# Patient Record
Sex: FEMALE | Race: BLACK OR AFRICAN AMERICAN
[De-identification: names, ages, dates, MRNs, and addresses within clinical notes are randomized per-mention and may not be internally consistent; named-entity substitution may affect disease eponyms.]

---

## 2019-02-11 ENCOUNTER — HOSPITAL ENCOUNTER (EMERGENCY)
Dept: HOSPITAL 74 - JER | Age: 29
LOS: 1 days | Discharge: HOME | End: 2019-02-12
Payer: COMMERCIAL

## 2019-02-11 VITALS — BODY MASS INDEX: 40.3 KG/M2

## 2019-02-11 DIAGNOSIS — Z3A.13: ICD-10-CM

## 2019-02-11 DIAGNOSIS — O26.891: Primary | ICD-10-CM

## 2019-02-11 DIAGNOSIS — O21.0: ICD-10-CM

## 2019-02-11 LAB
ALBUMIN SERPL-MCNC: 3.2 G/DL (ref 3.4–5)
ALP SERPL-CCNC: 94 U/L (ref 45–117)
ALT SERPL-CCNC: 17 U/L (ref 13–61)
ANION GAP SERPL CALC-SCNC: 10 MMOL/L (ref 8–16)
ANION GAP SERPL CALC-SCNC: 8 MMOL/L (ref 8–16)
AST SERPL-CCNC: 12 U/L (ref 15–37)
BASOPHILS # BLD: 0.8 % (ref 0–2)
BILIRUB DIRECT SERPL-MCNC: 143 U/L (ref 84–246)
BILIRUB SERPL-MCNC: 0.2 MG/DL (ref 0.2–1)
BUN SERPL-MCNC: 7 MG/DL (ref 7–18)
BUN SERPL-MCNC: 8 MG/DL (ref 7–18)
CALCIUM SERPL-MCNC: 8.1 MG/DL (ref 8.5–10.1)
CALCIUM SERPL-MCNC: 9.2 MG/DL (ref 8.5–10.1)
CHLORIDE SERPL-SCNC: 107 MMOL/L (ref 98–107)
CHLORIDE SERPL-SCNC: 109 MMOL/L (ref 98–107)
CO2 SERPL-SCNC: 22 MMOL/L (ref 21–32)
CO2 SERPL-SCNC: 22 MMOL/L (ref 21–32)
CREAT SERPL-MCNC: 0.5 MG/DL (ref 0.55–1.3)
CREAT SERPL-MCNC: 0.5 MG/DL (ref 0.55–1.3)
DEPRECATED RDW RBC AUTO: 17.2 % (ref 11.6–15.6)
EOSINOPHIL # BLD: 1.8 % (ref 0–4.5)
GLUCOSE SERPL-MCNC: 101 MG/DL (ref 74–106)
GLUCOSE SERPL-MCNC: 107 MG/DL (ref 74–106)
HCT VFR BLD CALC: 35.1 % (ref 32.4–45.2)
HGB BLD-MCNC: 11.7 GM/DL (ref 10.7–15.3)
LYMPHOCYTES # BLD: 27.2 % (ref 8–40)
MAGNESIUM SERPL-MCNC: 1.9 MG/DL (ref 1.8–2.4)
MCH RBC QN AUTO: 26.5 PG (ref 25.7–33.7)
MCHC RBC AUTO-ENTMCNC: 33.2 G/DL (ref 32–36)
MCV RBC: 80 FL (ref 80–96)
MONOCYTES # BLD AUTO: 6.2 % (ref 3.8–10.2)
NEUTROPHILS # BLD: 64 % (ref 42.8–82.8)
PLATELET # BLD AUTO: 265 K/MM3 (ref 134–434)
PMV BLD: 8.3 FL (ref 7.5–11.1)
POTASSIUM SERPLBLD-SCNC: 3.7 MMOL/L (ref 3.5–5.1)
POTASSIUM SERPLBLD-SCNC: 3.8 MMOL/L (ref 3.5–5.1)
PROT SERPL-MCNC: 7 G/DL (ref 6.4–8.2)
RBC # BLD AUTO: 4.4 M/MM3 (ref 3.6–5.2)
SODIUM SERPL-SCNC: 137 MMOL/L (ref 136–145)
SODIUM SERPL-SCNC: 140 MMOL/L (ref 136–145)
URATE SERPL-SCNC: 2.4 MG/DL (ref 2.6–7.2)
WBC # BLD AUTO: 9.1 K/MM3 (ref 4–10)

## 2019-02-11 PROCEDURE — 3E033GC INTRODUCTION OF OTHER THERAPEUTIC SUBSTANCE INTO PERIPHERAL VEIN, PERCUTANEOUS APPROACH: ICD-10-PCS

## 2019-02-11 NOTE — PDOC
Attending Attestation





- HPI


HPI: 





19 22:18





The patient is a 29 year old female, A2, with no PMH who presents to the ER 

complaining of vomiting today. Patient states she had 8 episodes of nonbloody, 

non bilious vomiting with associated 7/10 bilateral pelvic pain. Last menstrual 

period was . Patient is following up with Dr. Marquez, OB GYN. 

Patient notes similar symptoms in her previous pregnancy. 





The patient denies any vaginal bleeding, cp, sob, fever, chills, N/V/D/C, or 

urinary symptoms. 








<Esther Johnson - Last Filed: 19 22:17>





- Resident


Resident Name: Raquel Marmolejo





- ED Attending Attestation


I have performed the following: I have examined & evaluated the patient, The 

case was reviewed & discussed with the resident, I agree w/resident's findings 

& plan





- Physicial Exam


PE: 





19 01:40


Agree with resident's exam





- Medical Decision Making





19 01:40


29-year-old female with lower abdominal cramping increases with movement, 

currently 13 weeks' gestational age confirmed with ultrasound


Patient improved after IV fluids and Reglan in the emergency department


Initial blood pressure borderline elevated though there is absence of edema or 

proteinuria


Repeat blood pressures 129/76


Patient is ready to go home


Plan for discharge home with prompt GYN follow-up





19 01:47








<Silvia Wolfe - Last Filed: 19 01:47>

## 2019-02-11 NOTE — PDOC
Rapid Medical Evaluation


Medical Evaluation: 


 Allergies











Allergy/AdvReac Type Severity Reaction Status Date / Time


 


No Known Allergies Allergy   Verified 12/06/13 07:18








I have performed a brief in-person evaluation of this patient.


The patient presents with a chief complaint of: 13 weeks pregnant, c/o multiple 

episodes of emesis since start of pregnancy, worse this week; denies vaginal 

bleeding, but has some lower abdominal pain


Pertinent physical exam findings: In NAD


I have ordered the following: Labs, pelvic sono


The patient will proceed to the ED for further evaluation.





02/11/19 19:41

## 2019-02-11 NOTE — PDOC
History of Present Illness





- General


Chief Complaint: Nausea/Vomiting


Stated Complaint: 13 WEEK PREG, VOMITING


Time Seen by Provider: 19 19:41





- History of Present Illness


Initial Comments: 





19 21:41


29 year old woman, A2, who presents with multiple episodes of nbnb vomiting 

x 8 





since this pregnancy that is worse this week 





The patient denies any vaginal bleeding, but admits to some lower abdominal 

pain. 














19 22:35








Past History





- Past Medical History


Allergies/Adverse Reactions: 


 Allergies











Allergy/AdvReac Type Severity Reaction Status Date / Time


 


No Known Allergies Allergy   Verified 13 07:18











Home Medications: 


Ambulatory Orders





Acetaminophen [Tylenol .Regular Strength -] 650 mg PO Q4H PRN #0 tablet 12/10/

13 


Ibuprofen [Motrin -] 600 mg PO Q4H PRN #0 tablet 12/10/13 


Doxylamine Succinate/Vit B6 [Diclegis Dr 10-10 mg Tablet] 1 each PO DAILY #20 

tablet.dr VILLEGAS 40mg 19 


Doxylamine Succinate/Vit B6 [Diclegis Dr 10-10 mg Tablet] 1 each PO DAILY #20 

tablet.dr VILLEGAS 40mg 19 


Doxylamine Succinate/Vit B6 [Diclegis Dr 10-10 mg Tablet] 1 each PO DAILY #20 

tablet.dr VILLEGAS 40mg 19 








Anemia: Yes


Asthma: No


Cancer: No


Cardiac Disorders: No


COPD: No


Diabetes: No


HTN: Yes


Seizures: No


Thyroid Disease: No





- Suicide/Smoking/Psychosocial Hx


Smoking Status: No


Smoking History: Never smoked


Have you smoked in the past 12 months: No


Number of Cigarettes Smoked Daily: 0


Hx Alcohol Use: No


Drug/Substance Use Hx: No


Hx Substance Use Treatment: No





*Physical Exam





- Vital Signs


 Last Vital Signs











Temp Pulse Resp BP Pulse Ox


 


 99.4 F   100 H  18   158/88   100 


 


 19 19:42  19 19:42  19 19:42  19 19:42  19 19:42














Moderate Sedation





- Procedure Monitoring


Vital Signs: 


Procedure Monitoring Vital Signs











Temperature  99.4 F   19 19:42


 


Pulse Rate  100 H  19 19:42


 


Respiratory Rate  18   19 19:42


 


Blood Pressure  158/88   19 19:42


 


O2 Sat by Pulse Oximetry (%)  100   19 19:42











ED Treatment Course





- LABORATORY


CBC & Chemistry Diagram: 


 19 20:10





 19 20:10





- ADDITIONAL ORDERS


Additional order review: 


 Laboratory  Results











  19





  20:10


 


Sodium  140


 


Potassium  3.8


 


Chloride  109 H


 


Carbon Dioxide  22


 


Anion Gap  10


 


BUN  8


 


Creatinine  0.5 L


 


Creat Clearance w eGFR  > 60


 


Random Glucose  107 H


 


Calcium  9.2


 


Beta HCG, Quant  15573.1








 











  19





  20:10


 


RBC  4.40


 


MCV  80.0


 


MCHC  33.2


 


RDW  17.2 H


 


MPV  8.3


 


Neutrophils %  64.0


 


Lymphocytes %  27.2  D


 


Monocytes %  6.2


 


Eosinophils %  1.8


 


Basophils %  0.8














*DC/Admit/Observation/Transfer


Diagnosis at time of Disposition: 


 Nausea and vomiting during pregnancy








- Discharge Dispostion


Disposition: HOME


Condition at time of disposition: Stable


Decision to Admit order: No





- Referrals





- Patient Instructions


Printed Discharge Instructions:  DI for Vomiting -- Adult


Additional Instructions: 


You were seen in the ED for complaints of nausea and vomiting in pregnancy.





In the ED you were evaluated with labwork and imaging. 


Your results showed a single live intrauterine pregnancy at 13 weeks and 4 days 

w/ fetal heart rate of 174bpm.


There does not appear to be an acute need for immediate hospitalization.





You are advised to follow up with your Primary Care Physician within 1 week.


Please follow up with your OBGYN within 1 week.


You were given a prescription for Diclegis and are advised to take the 

medication as directed.





Return to the ED immediately if you experience worsening abdominal pain, 

worsening nausea, vomiting, blood in the vomit, vaginal bleeding or discharge, 

headaches, loss of consciousness or fever. 








- Post Discharge Activity

## 2019-02-12 VITALS — HEART RATE: 80 BPM | TEMPERATURE: 99 F | SYSTOLIC BLOOD PRESSURE: 129 MMHG | DIASTOLIC BLOOD PRESSURE: 76 MMHG

## 2019-02-12 LAB
APPEARANCE UR: CLEAR
BILIRUB UR STRIP.AUTO-MCNC: NEGATIVE MG/DL
COLOR UR: YELLOW
KETONES UR QL STRIP: NEGATIVE
LEUKOCYTE ESTERASE UR QL STRIP.AUTO: NEGATIVE
NITRITE UR QL STRIP: NEGATIVE
PH UR: 6 [PH] (ref 5–8)
PROT UR QL STRIP: NEGATIVE
PROT UR QL STRIP: NEGATIVE
SP GR UR: 1.02 (ref 1.01–1.03)
UROBILINOGEN UR STRIP-MCNC: NEGATIVE MG/DL (ref 0.2–1)

## 2019-02-12 NOTE — PDOC
*Physical Exam





- Vital Signs


 Last Vital Signs











Temp Pulse Resp BP Pulse Ox


 


 99.4 F   100 H  18   158/88   100 


 


 02/11/19 19:42  02/11/19 19:42  02/11/19 19:42  02/11/19 19:42  02/11/19 19:42














ED Treatment Course





- LABORATORY


CBC & Chemistry Diagram: 


 02/11/19 20:10





 02/11/19 23:13





- ADDITIONAL ORDERS


Additional order review: 


 Laboratory  Results











  02/12/19 02/11/19 02/11/19





  00:52 23:13 20:10


 


Sodium   137  140


 


Potassium   3.7  3.8


 


Chloride   107  109 H


 


Carbon Dioxide   22  22


 


Anion Gap   8  10


 


BUN   7  8


 


Creatinine   0.5 L  0.5 L


 


Creat Clearance w eGFR   > 60  > 60


 


Random Glucose   101  107 H


 


Uric Acid   2.4 L 


 


Calcium   8.1 L  9.2


 


Magnesium   1.9 


 


Total Bilirubin   0.2 


 


AST   12 L 


 


ALT   17 


 


Alkaline Phosphatase   94 


 


LD Total   143 


 


Total Protein   7.0 


 


Albumin   3.2 L 


 


Beta HCG, Quant    76727.1


 


Urine Color  Yellow  


 


Urine Appearance  Clear  


 


Urine pH  6.0  


 


Ur Specific Gravity  1.023  


 


Urine Protein  Negative  


 


Urine Glucose (UA)  Negative  


 


Urine Ketones  Negative  


 


Urine Blood  Negative  


 


Urine Nitrite  Negative  


 


Urine Bilirubin  Negative  


 


Urine Urobilinogen  Negative  


 


Ur Leukocyte Esterase  Negative  








 











  02/11/19





  20:10


 


RBC  4.40


 


MCV  80.0


 


MCHC  33.2


 


RDW  17.2 H


 


MPV  8.3


 


Neutrophils %  64.0


 


Lymphocytes %  27.2  D


 


Monocytes %  6.2


 


Eosinophils %  1.8


 


Basophils %  0.8














- Medications


Given in the ED: 


ED Medications














Discontinued Medications














Generic Name Dose Route Start Last Admin





  Trade Name Garoq  PRN Reason Stop Dose Admin


 


Sodium Chloride  1,000 mls @ 1,000 mls/hr  02/11/19 19:43  02/11/19 21:52





  Normal Saline -  IV  02/11/19 20:42  1,000 mls/hr





  ASDIR STA   Administration





     





     





     





     


 


Ondansetron HCl  4 mg  02/11/19 19:43  02/11/19 21:52





  Zofran Injection  IVPUSH  02/11/19 19:44  4 mg





  ONCE ONE   Administration





     





     





     





     


 


Pyridoxine HCl  50 mg  02/11/19 19:45  02/12/19 00:26





  Vitamin B6 -  PO  02/11/19 19:46  Not Given





  ONCE ONE   





     





     





     





     














Medical Decision Making





- Medical Decision Making





Received sign out from resident Dr. Perez. Pregnant female at A 13wks with 

nausea and vomiting. H/o pre-eclampsia with previous pregnancy. Pelvic 

ultrasound revealed single live IUP with fetal heart rate of 174 bpm. Will 

follow up on pending labs and UA. 





CMP unremarkable for significant electrolyte derangement, LFT elevation, or 

renal function. UA unremarkable for pyuria, nitrites, or leukocyte esterase. 

Low suspicion for cystitis. 





Pt stable to discharge home with outpatient follow up. Discussed imaging and 

laboratory results with pt. Answered all questions. Provided return 

precautions. Pt expressed verbal understanding and agreement with plan to 

discharge home with outpatient follow up. Provided copies of laboratory and 

imaging results. 








*DC/Admit/Observation/Transfer


Diagnosis at time of Disposition: 


 Nausea and vomiting during pregnancy








- Discharge Dispostion


Disposition: HOME


Condition at time of disposition: Stable





- Prescriptions


Prescriptions: 


Doxylamine Succinate/Vit B6 [Diclegis Dr 10-10 mg Tablet] 1 each PO DAILY #20 

tablet.dr VILLEGAS 40mg


Doxylamine Succinate/Vit B6 [Diclegis Dr 10-10 mg Tablet] 1 each PO DAILY #20 

tablet.dr VILLEGAS 40mg


Doxylamine Succinate/Vit B6 [Diclegis Dr 10-10 mg Tablet] 1 each PO DAILY #20 

tablet.dr VILLEGAS 40mg





- Referrals





- Patient Instructions


Printed Discharge Instructions:  DI for Vomiting -- Adult


Additional Instructions: 


You were seen in the ED for complaints of nausea and vomiting in pregnancy.





In the ED you were evaluated with labwork and imaging. 


Your results showed a single live intrauterine pregnancy at 13 weeks and 4 days 

w/ fetal heart rate of 174bpm.


There does not appear to be an acute need for immediate hospitalization.





You are advised to follow up with your Primary Care Physician within 1 week.


Please follow up with your OBGYN within 1 week.


You were given a prescription for Diclegis and are advised to take the 

medication as directed.





Return to the ED immediately if you experience worsening abdominal pain, 

worsening nausea, vomiting, blood in the vomit, vaginal bleeding or discharge, 

headaches, loss of consciousness or fever. 





Print Language: ENGLISH





- Post Discharge Activity


Forms/Work/School Notes:  Back to Work

## 2019-03-06 ENCOUNTER — HOSPITAL ENCOUNTER (EMERGENCY)
Dept: HOSPITAL 74 - JER | Age: 29
Discharge: HOME | End: 2019-03-06
Payer: COMMERCIAL

## 2019-03-06 VITALS — HEART RATE: 80 BPM

## 2019-03-06 VITALS — SYSTOLIC BLOOD PRESSURE: 127 MMHG | TEMPERATURE: 99.2 F | DIASTOLIC BLOOD PRESSURE: 81 MMHG

## 2019-03-06 VITALS — BODY MASS INDEX: 40.3 KG/M2

## 2019-03-06 DIAGNOSIS — O26.892: Primary | ICD-10-CM

## 2019-03-06 DIAGNOSIS — N93.9: ICD-10-CM

## 2019-03-06 DIAGNOSIS — Z3A.16: ICD-10-CM

## 2019-03-06 LAB
ALBUMIN SERPL-MCNC: 3.2 G/DL (ref 3.4–5)
ALP SERPL-CCNC: 101 U/L (ref 45–117)
ALT SERPL-CCNC: 17 U/L (ref 13–61)
ANION GAP SERPL CALC-SCNC: 8 MMOL/L (ref 8–16)
APPEARANCE UR: (no result)
AST SERPL-CCNC: 8 U/L (ref 15–37)
BASOPHILS # BLD: 0.4 % (ref 0–2)
BILIRUB SERPL-MCNC: 0.3 MG/DL (ref 0.2–1)
BILIRUB UR STRIP.AUTO-MCNC: NEGATIVE MG/DL
BUN SERPL-MCNC: 7 MG/DL (ref 7–18)
CALCIUM SERPL-MCNC: 9.1 MG/DL (ref 8.5–10.1)
CHLORIDE SERPL-SCNC: 109 MMOL/L (ref 98–107)
CO2 SERPL-SCNC: 22 MMOL/L (ref 21–32)
COLOR UR: (no result)
CREAT SERPL-MCNC: 0.5 MG/DL (ref 0.55–1.3)
DEPRECATED RDW RBC AUTO: 17 % (ref 11.6–15.6)
EOSINOPHIL # BLD: 1.9 % (ref 0–4.5)
EPITH CASTS URNS QL MICRO: (no result) /HPF
GLUCOSE SERPL-MCNC: 87 MG/DL (ref 74–106)
HCT VFR BLD CALC: 34.4 % (ref 32.4–45.2)
HGB BLD-MCNC: 11.5 GM/DL (ref 10.7–15.3)
INR BLD: 1.03 (ref 0.83–1.09)
KETONES UR QL STRIP: (no result)
LEUKOCYTE ESTERASE UR QL STRIP.AUTO: NEGATIVE
LYMPHOCYTES # BLD: 18.5 % (ref 8–40)
MCH RBC QN AUTO: 27.5 PG (ref 25.7–33.7)
MCHC RBC AUTO-ENTMCNC: 33.6 G/DL (ref 32–36)
MCV RBC: 81.8 FL (ref 80–96)
MONOCYTES # BLD AUTO: 5.5 % (ref 3.8–10.2)
MUCOUS THREADS URNS QL MICRO: (no result)
NEUTROPHILS # BLD: 73.7 % (ref 42.8–82.8)
NITRITE UR QL STRIP: NEGATIVE
PH UR: 6 [PH] (ref 5–8)
PLATELET # BLD AUTO: 233 K/MM3 (ref 134–434)
PMV BLD: 8.3 FL (ref 7.5–11.1)
POTASSIUM SERPLBLD-SCNC: 3.6 MMOL/L (ref 3.5–5.1)
PROT SERPL-MCNC: 7.2 G/DL (ref 6.4–8.2)
PROT UR QL STRIP: (no result)
PROT UR QL STRIP: NEGATIVE
PT PNL PPP: 12.2 SEC (ref 9.7–13)
RBC # BLD AUTO: 4.2 M/MM3 (ref 3.6–5.2)
SODIUM SERPL-SCNC: 139 MMOL/L (ref 136–145)
SP GR UR: 1.03 (ref 1.01–1.03)
UROBILINOGEN UR STRIP-MCNC: NEGATIVE MG/DL (ref 0.2–1)
WBC # BLD AUTO: 10.1 K/MM3 (ref 4–10)

## 2019-03-06 PROCEDURE — 3E0337Z INTRODUCTION OF ELECTROLYTIC AND WATER BALANCE SUBSTANCE INTO PERIPHERAL VEIN, PERCUTANEOUS APPROACH: ICD-10-PCS | Performed by: EMERGENCY MEDICINE

## 2019-03-06 NOTE — PDOC
Documentation entered by Katiuska Saldaña SCRIBE, acting as scribe for Hannah Ames MD.





Attending Attestation





- Resident


Resident Name: Constanza Brooks





- HPI


HPI: 





19 14:26


The patient is a 29 year old female, , 16 weeks pregnant, with no 

significant past medical history who presents to the ED with vaginal bleeding 

and lower abdominal cramping today. She states she has been passing blood clots

, but denies passing tissue. 





She denies any chest pain, SOB, palpitations, dizziness. She denies diarrhea, 

constipation, melena, hematochezia. 





- Physicial Exam


PE: 





19 16:12


awake alert lungs clear bilaterally heart rrr no mrg abd soft nt nd.  gravid 

abd., obese. ext wwp. nuero alert oriented x 3.








- Medical Decision Making





19 16:13


differential missed or incomlete ab, plan ob us, will d/w dr macias. pt beta 

hcg inconsistent. US with live IUP.


guillermo Gross will seee pt outp.








Hannah Ames MD:  This documentation has been prepared by the chasidyibePiotr Amanda, SCRIBE, under my direction and personally reviewed by me in its 

entirety.  I confirm that the documentation accurately reflects all work, 

treatment, procedures, and medical decision making performed by me.

## 2019-03-06 NOTE — PDOC
History of Present Illness





- General


Chief Complaint: Vaginal Bleeding


Stated Complaint: 16 WK PREG / BLEED


Time Seen by Provider: 19 13:20


History Source: Patient


Exam Limitations: No Limitations





- History of Present Illness


Initial Comments: 


Pt is a 28 yo F, A2 @16 weeks, with PMH of pre-eclampsia during past 

pregnancies, who is presenting with vaginal bleeding since 12 pm today. Pt 

states she woke up to take a shower, and had passage of "a gush" of vaginal 

bleeding with clots. Pt did not notice any passage of tissue, and denies any 

recent trauma/MVA/falls. The bleeding is associated with mild lower abdominal 

cramping, which does not radiate to her back. Pt has also noticed mild dysuria 

over the past 3 days, with no hematuria or urgency. She has had nausea and 

vomiting throughout pregnancy. Pt denies any recent fevers/chills, headache, 

vision changes, syncope, chest pain, palpitations, SOB, diarrhea/constipation, 

or leg swelling.





Social: Pt denies any cigarette, alcohol, or drug use.


Pt denies any recent travel or sick contacts.


Surgical: C-sections.


Family: no relevant history.


19 14:48








Past History





- Travel


Traveled outside of the country in the last 30 days: No


Close contact w/someone who was outside of country & ill: No





- Past Medical History


Allergies/Adverse Reactions: 


 Allergies











Allergy/AdvReac Type Severity Reaction Status Date / Time


 


No Known Allergies Allergy   Verified 13 07:18











Home Medications: 


Ambulatory Orders





Acetaminophen [Tylenol .Regular Strength -] 650 mg PO Q4H PRN #0 tablet 12/10/

13 


Ibuprofen [Motrin -] 600 mg PO Q4H PRN #0 tablet 12/10/13 


Doxylamine Succinate/Vit B6 [Diclegis Dr 10-10 mg Tablet] 1 each PO DAILY #20 

tablet.dr VILLEGAS 40mg 19 


Doxylamine Succinate/Vit B6 [Diclegis Dr 10-10 mg Tablet] 1 each PO DAILY #20 

tablet.dr VILLEGAS 40mg 19 


Doxylamine Succinate/Vit B6 [Diclegis Dr 10-10 mg Tablet] 1 each PO DAILY #20 

tablet.dr VILLEGAS 40mg 19 








Anemia: Yes


Asthma: No


Cancer: No


Cardiac Disorders: No


COPD: No


Diabetes: No


HTN: Yes


Seizures: No


Thyroid Disease: No





- Reproductive History


Is Patient Pregnant Now?: Yes





- Suicide/Smoking/Psychosocial Hx


Smoking Status: No


Smoking History: Never smoked


Have you smoked in the past 12 months: No


Number of Cigarettes Smoked Daily: 0


Information on smoking cessation initiated: No


Hx Alcohol Use: No


Drug/Substance Use Hx: No


Hx Substance Use Treatment: No





**Review of Systems





- Review of Systems


Able to Perform ROS?: Yes


Is the patient limited English proficient: No


Constitutional: Yes: Weight Stable.  No: Chills, Diaphoresis, Fever, Loss of 

Appetite, Malaise, Weakness


HEENTM: No: Recent change in vision, Nose Congestion, Throat Pain, Throat 

Swelling


Respiratory: No: Cough, Shortness of Breath


Cardiac (ROS): No: Chest Pain, Edema, Irregular Heart Rate, Lightheadedness, 

Palpitations, Syncope, Chest Tightness


ABD/GI: Yes: See HPI, Nausea, Vomiting, Abdominal cramping.  No: Constipated, 

Diarrhea, Poor Fluid Intake, Indigestion


: Yes: See HPI, Dysuria, Other (vaginal bleeding today).  No: Discharge, 

Frequency, Flank Pain, Hematuria, Incontinence, Pain, Urgency


Musculoskeletal: No: Back Pain, Joint Pain, Muscle Pain, Muscle Weakness


Integumentary: No: Rash


Neurological: No: Headache, Numbness, Seizure, Weakness, Dizziness


Psychiatric: No: Sleep Pattern Change


Endocrine: No: Increased Urine, Change in Weight


Hematologic/Lymphatic: No: Anemia, Blood Clots, Easy Bleeding, Easy Bruising





*Physical Exam





- Vital Signs


 Last Vital Signs











Temp Pulse Resp BP Pulse Ox


 


 99.2 F   124 H  20   127/81   100 


 


 19 12:45  19 12:45  19 12:45  19 12:45  19 12:45














- Physical Exam


Comments: 


Pt tachycardic at 124, but appears anxious, pt afebrile. Pt in NAD, normal body 

habitus. PE showed pt alert and oriented. CNs generally intact, muscular 

strength and sensation intact. Eyes PERRLA, EOMI. Oropharynx without erythema 

or exudates, no LAD b/l. No nasal congestion, hearing intact. Clear heart sounds

, S1/S2, no JVD, b/l pedal edema, or heart murmur. Clear lung sounds, no 

respiratory distress, wheezes, crackles, or accessory muscle use. Mild diffuse 

lower abdominal tenderness to palpation. No CVA tenderness, no rebound, no 

guarding. Abdomen soft, non-distended, and with normoactive bowel sounds, 

fundus below level of umbilicus. Vaginal exam showed dried clotted blood, no 

active bleeding or pooling, cervix closed. No CMT tenderness, but mild R 

adnexal tenderness. Skin without jaundice or rash. 


19 14:54








Moderate Sedation





- Procedure Monitoring


Vital Signs: 


Procedure Monitoring Vital Signs











Temperature  99.2 F   19 12:45


 


Pulse Rate  124 H  19 12:45


 


Respiratory Rate  20   19 12:45


 


Blood Pressure  127/81   19 12:45


 


O2 Sat by Pulse Oximetry (%)  100   19 12:45











ED Treatment Course





- LABORATORY


CBC & Chemistry Diagram: 


 19 13:37





 19 13:37





Medical Decision Making





- Medical Decision Making


Pt was seen at bedside, also will be seen by attending Dr. Ames. Pt A2 @

16 weeks, who is presenting with vaginal bleeding since 12 pm today. Pt states 

she woke up to take a shower, and had passage of "a gush" of vaginal bleeding 

with clots. Pt did not notice any passage of tissue, and denies any recent 

trauma/MVA/falls. The bleeding is associated with mild lower abdominal cramping

, which does not radiate to her back. Pt has also noticed mild dysuria over the 

past 3 days, with no hematuria or urgency. She has had nausea and vomiting 

throughout pregnancy. Pt denies any recent fevers/chills, headache, vision 

changes, syncope, chest pain, palpitations, SOB, diarrhea/constipation, or leg 

swelling.





Pt tachycardic at 124, but appears anxious, pt afebrile. Pt in NAD, normal body 

habitus. PE showed pt alert and oriented. CNs generally intact, muscular 

strength and sensation intact. Eyes PERRLA, EOMI. Oropharynx without erythema 

or exudates, no LAD b/l. No nasal congestion, hearing intact. Clear heart sounds

, S1/S2, no JVD, b/l pedal edema, or heart murmur. Clear lung sounds, no 

respiratory distress, wheezes, crackles, or accessory muscle use. Mild diffuse 

lower abdominal tenderness to palpation. No CVA tenderness, no rebound, no 

guarding. Abdomen soft, non-distended, and with normoactive bowel sounds, 

fundus below level of umbilicus. Vaginal exam showed dried clotted blood, no 

active bleeding or pooling, cervix closed. No CMT tenderness, but mild R 

adnexal tenderness. Skin without jaundice or rash. 





Considering normal vaginal bleeding of pregnancy vs miscarriage vs placenta 

previa/accreta. Unlikely ectopic as pt had previously confirmed IUP at 13 

weeks. Pt has minimal pain and is hemodynamically stable, unlikely abruption. 


Ordered work-up including CBC, CMP, coags, type and screen, UA, urine culture, 

transvaginal US (look at cervical length and IUP).


Provided 1 L IV NS for improvement of volume loss/tachycardia.


Will continue to reassess pt and monitor for symptomatic improvement.


19 14:45





CBC generally WNL. H/H stable


Coags WNL


Pending CMP


19 14:50





CMP WNL


Beta-hcg 11,800 (dropped from 30,000 about 3 weeks ago). Likely spontaneous 

.


Pending US results and type and screen (need for rho-justine). 


19 14:56





UA negative for infection. Sent culture to lab.


19 15:28





[id number] US/TRANSVAGINAL US PREG


[id number] US/PREGNANCY LIMITED US


HISTORY PROVIDED: Pregnancy evaluation.


Real time examination of the pelvis demonstrates the following:


There is a single live intrauterine pregnancy with fetal measurements 

corresponding to a


gestational age of 16 weeks 6 days. A fetal heart rate of 155 BPM was 

calculated.


The placenta is posterior in location. An adequate amount of amniotic fluid is 

identified. The


fetus is in a breech presentation at the present time.


Transvaginal imaging was performed in order to evaluate the cervix. A cervical 

length of 6.8


cm was measured. The cervix is closed.


IMPRESSION:


Single live intrauterine gestation of 16 weeks 6 days gestational age.


19 15:51





Blood Type A Positive


Calling Dr. Liu to confirm close follow-up. Pending call back.


19 15:51





Repeat vitals showed no tachycardia and vitals stable after fluids and pt is 

less anxious. 


19 15:54





Dr. Liu can see pt in 1-2 weeks for follow-up, and he will speak with her 

sooner if she has any issues.


Pt can be discharged to home with follow-up. Pt is a CNA, so I also provided 

work note to avoid heavy lifting and copy of US for pts appointment. 


19 16:10








*DC/Admit/Observation/Transfer


Diagnosis at time of Disposition: 


 Vaginal bleeding during pregnancy








- Discharge Dispostion


Disposition: HOME


Condition at time of disposition: Good


Decision to Admit order: No





- Referrals


Referrals: 


Julio Liu MD [Staff Physician] - 





- Patient Instructions


Printed Discharge Instructions:  DI for Vaginal Bleeding During Pregnancy


Additional Instructions: 


You were seen in the ER today for vaginal bleeding during pregnancy. The 

results of your labs and imaging today were normal, and your ultrasound showed 

the pregnancy intact in the uterus, with a closed cervix. Please follow-up with 

your OB-GYN doctor within 1-2 days to discuss your visit and make sure your 

symptoms have improved. Please avoid any heavy lifting or vaginal intercourse 

until your bleeding stops and you have seen your OB doctor. Please return to 

the ER if you have any worsening pain, bleeding that soaks through 1-2 heavy 

pads per hour, development of fevers or chills, loss of consciousness, 

inability to tolerate food or fluids, or any other concerns.








- Post Discharge Activity


Forms/Work/School Notes:  Back to Work

## 2019-07-22 ENCOUNTER — HOSPITAL ENCOUNTER (INPATIENT)
Dept: HOSPITAL 74 - JLDR | Age: 29
LOS: 24 days | Discharge: HOME | DRG: 540 | End: 2019-08-15
Attending: OBSTETRICS & GYNECOLOGY | Admitting: OBSTETRICS & GYNECOLOGY
Payer: COMMERCIAL

## 2019-07-22 VITALS — BODY MASS INDEX: 38 KG/M2

## 2019-07-22 DIAGNOSIS — Z3A.39: ICD-10-CM

## 2019-07-22 DIAGNOSIS — O34.219: Primary | ICD-10-CM

## 2019-08-12 RX ADMIN — Medication SCH MLS/HR: at 11:55

## 2019-08-12 RX ADMIN — SODIUM CHLORIDE, SODIUM GLUCONATE, SODIUM ACETATE, POTASSIUM CHLORIDE, AND MAGNESIUM CHLORIDE SCH MLS/HR: 526; 502; 368; 37; 30 INJECTION, SOLUTION INTRAVENOUS at 08:20

## 2019-08-12 NOTE — OP
Operative Note





- Note:


Operative Date: 19


Pre-Operative Diagnosis: 39 week pregnancy, prior C/S x 2, Repeat , 

desires tubal sterilization


Operation: Repeat Low Transverse 


Findings: 





VMI, KIM position, Apgars 9/9, weight pending, no nuchal. no meconium. 


Uterine dehiscence with intact fetal membranes and head and shoulders 

protruding through the uterus


Tubes and ovaries not visualized secondary to dense abdominal wall adhesions 

anteriorly and posteriorly


Post-Operative Diagnosis: Same as Pre-op


Surgeon: Kimberley Dorantes


Assistant: Cas Mina


Anesthesia: Spinal


Estimated Blood Loss (mls): 400


Drains, Volume Out (mls): 200 (clear urine)


Operative Report Dictated: No

## 2019-08-12 NOTE — HP
Past Medical History





- Admission


Chief Complaint: Scheduled RLTCS


History of Present Illness: 





28yo  @ 39.0wks here for scheduled RLTCS and permanent sterilization


No VB/LOF. No ctx.





Preg c/b 2 prior C/S, obesity, HSV- no outbreaks; on suppression


History Source: Patient


Limitations to Obtaining History: No Limitations





- Past Medical History


CNS: No: Alzheimer's, CVA, Dementia, Migraine, Multiple Sclerosis, Peripheral 

Neuropathy, Parkinson's, Seizure, Syncope, TIA, Vertigo, Other


Cardiovascular: No: AFIB, Aneurysm, Aortic Insufficiency, Aortic Stenosis, CAD, 

CHF, Deep Vein Thrombosis, HTN, Hyperlipdemia, MI, Mitral Insufficiency, Mitral 

Stenosis, Murmur, Pulmonary Hypertension, Other


Pulmonary: No: Asthma, Bronchitis, Cancer, COPD, O2 Dependent, Pneumonia, 

Previously Intubated, Pulmonary Embolus, Pulmonary Fibrosis, Sleep Apnea, Other


Gastrointestinal: No: Ascites, Cancer, Constipation, Crohn's Disease, 

Diverticulitis, Diverticulosis, Esophageal Varices, Gastritis, GERD, GI Bleed, 

Hemorrhoids, Hiatal Hernia, Inflamatory Bowel Disease, Irritable Bowel Disease, 

Pancreatitis, Peptic Ulcer Disease, Ulcerative Colitis, Other


Hepatobiliary: No: Cirrhosis, Cholelithiasis, Cholecystitis, Choledocholithiasis

, Hepatitis A, Hepatitis B, Hepatitis C, Other


Renal/: No: Renal Failure, Renal Inusuff, BPH, Cancer, Hematuria, Hemodialysis

, Neurogenic Bladder, Renal Calculi, UTI, Other


Reproductive: No: Ectopic Pregnancy, Endometriosis, Fibroids, PID, Polycystic 

Ovary Syndrome, Postmenopausal, Other


Heme/Onc: No: Anemia, B12 Deficiency, Bleeding Disorder, Cancer, Current 

Chemotherapy, Current Radiation Therapy, Hemochromatosis, Hypercoaguable State, 

Myeloproliferative Synd, Sickle Cell Disease, Sickle Cell Trait, 

Thrombocytopenia, Other


Infectious Disease: Yes: Other (HSV)


Psych: No: Addictions, Anxiety, Bipolar, Depression, Panic, Psychosis, 

Schizophrenia, Other


Musculoskeletal: No: Bursitis, Chronic low back pain, Hemiparesis, Hemiplegia, 

Osteoarthritis, Paraplegia, Other


Rheumatology: No: Fibromyalgia, Gout, Lupus, Rheumatoid Arthritis, Sarcoidosis, 

Vasculitis, Other


ENT: No: Allergic Rhinitis, Sinusitis, Other


Endocrine: No: Mohnton's Disease, Cushing's Disease, Diabetes Insipidus, 

Diabetes Mellitus, Hyperparathyroidism, Hyperthyroidism, Hypothyroidism, 

Osteopenia, SIADH, Other


Dermatology: No: Basal Cell, Cellulitis, Eczema, Melanoma, Psoriasis, Squamous 

Cell, Other





- Past Surgical History


Past Surgical History: Yes: 


Hx Myomectomy: No


Hx Transabdominal Cerclage: No





- Smoking History


Smoking history: Never smoked


Have you smoked in the past 12 months: No


Aproximately how many cigarettes per day: 0





- Alcohol/Substance Use


Hx Alcohol Use: No





- Social History


Usual Living Arrangement: Yes: With Significant Other


ADL: Independent


History of Recent Travel: No





Home Medications





- Allergies


Allergies/Adverse Reactions: 


 Allergies











Allergy/AdvReac Type Severity Reaction Status Date / Time


 


No Known Allergies Allergy   Verified 13 07:18














- Home Medications


Home Medications: 


Ambulatory Orders





Acetaminophen [Tylenol .Regular Strength -] 650 mg PO Q4H PRN #0 tablet 12/10/

13 


Ibuprofen [Motrin -] 600 mg PO Q4H PRN #0 tablet 12/10/13 


Doxylamine Succinate/Vit B6 [Diclegis Dr 10-10 mg Tablet] 1 each PO DAILY #20 

tablet.dr VILLEGAS 40mg 19 


Doxylamine Succinate/Vit B6 [Diclegis Dr 10-10 mg Tablet] 1 each PO DAILY #20 

tablet.dr VILLEGAS 40mg 19 


Doxylamine Succinate/Vit B6 [Diclegis Dr 10-10 mg Tablet] 1 each PO DAILY #20 

tablet.dr VILLEGAS 40mg 19 











Physical Exam - Maternity


Constitutional: Yes: Well Nourished, No Distress, Calm


Eyes: Yes: WNL, Conjunctiva Clear, EOM Intact


HENT: Yes: WNL, Atraumatic, Normocephalic


Neck: Yes: WNL, Supple, Trachea Midline


Cardiovascular: Yes: WNL, Regular Rate and Rhythm


Breast(s): Yes: WNL





- Abdominal Exam/OB


Number of Fetuses: Single


Fetal Presentation: Vertex


Contractions: No


Fetal Heart Rate Location: [facility]


Category: I


Accelerations: Non-Uniform


Decelerations: None





- Vaginal Exam/OB


Vaginal Bleediing: No


Speculum Exam: No





- Physical Exam


Edema: No





Problem List





- Problems


(1)  section


Code(s): Z98.89 - OTHER SPECIFIED POSTPROCEDURAL STATES * DO NOT USE *   





Assessment/Plan





28yo  @ 39wks here for scheduled RLTCS and BTL


Admit to L&D


NPO, IVFs


Ancef


SCDs


Valladares


Risk of procedure reviewed including bleeding, infection and injury to the 

bladder/adnexa/bowel/vessels/nerves. Last BTL unable to be performed 2/2 

adhesions, pt aware of that risk again. All questions answered, consent signed.





INDIA Dorantes MD

## 2019-08-13 LAB
BASOPHILS # BLD: 0.2 % (ref 0–2)
DEPRECATED RDW RBC AUTO: 16.3 % (ref 11.6–15.6)
EOSINOPHIL # BLD: 2.2 % (ref 0–4.5)
HCT VFR BLD CALC: 34.5 % (ref 32.4–45.2)
HGB BLD-MCNC: 11.4 GM/DL (ref 10.7–15.3)
LYMPHOCYTES # BLD: 14.6 % (ref 8–40)
MCH RBC QN AUTO: 28.1 PG (ref 25.7–33.7)
MCHC RBC AUTO-ENTMCNC: 33.1 G/DL (ref 32–36)
MCV RBC: 84.7 FL (ref 80–96)
MONOCYTES # BLD AUTO: 8.8 % (ref 3.8–10.2)
NEUTROPHILS # BLD: 74.2 % (ref 42.8–82.8)
PLATELET # BLD AUTO: 186 K/MM3 (ref 134–434)
PMV BLD: 8.7 FL (ref 7.5–11.1)
RBC # BLD AUTO: 4.07 M/MM3 (ref 3.6–5.2)
WBC # BLD AUTO: 9.5 K/MM3 (ref 4–10)

## 2019-08-13 RX ADMIN — SIMETHICONE CHEW TAB 80 MG PRN MG: 80 TABLET ORAL at 23:06

## 2019-08-13 RX ADMIN — Medication SCH MLS/HR: at 15:01

## 2019-08-13 RX ADMIN — SODIUM CHLORIDE, SODIUM GLUCONATE, SODIUM ACETATE, POTASSIUM CHLORIDE, AND MAGNESIUM CHLORIDE SCH: 526; 502; 368; 37; 30 INJECTION, SOLUTION INTRAVENOUS at 15:01

## 2019-08-13 RX ADMIN — SIMETHICONE CHEW TAB 80 MG PRN MG: 80 TABLET ORAL at 14:14

## 2019-08-13 RX ADMIN — IBUPROFEN PRN MG: 600 TABLET, FILM COATED ORAL at 23:05

## 2019-08-13 RX ADMIN — SIMETHICONE CHEW TAB 80 MG PRN MG: 80 TABLET ORAL at 18:15

## 2019-08-13 RX ADMIN — ACETAMINOPHEN PRN MG: 325 TABLET ORAL at 18:17

## 2019-08-13 RX ADMIN — ACETAMINOPHEN PRN MG: 325 TABLET ORAL at 14:13

## 2019-08-13 RX ADMIN — ACETAMINOPHEN PRN MG: 325 TABLET ORAL at 04:13

## 2019-08-13 RX ADMIN — Medication SCH TAB: at 09:36

## 2019-08-13 RX ADMIN — ACETAMINOPHEN PRN MG: 325 TABLET ORAL at 09:35

## 2019-08-13 RX ADMIN — ACETAMINOPHEN PRN MG: 325 TABLET ORAL at 23:06

## 2019-08-13 NOTE — PN
Post Partum Progress Note





- Subjective


Subjective: 


Patient is feeling well, denies dizziness and N/V, lochia present, irving in 

place, patient desires to eat. 





Post Partum Day: 1


Type of Delivery: Repeat C/S


Vital Signs: 


 Vital Signs











Temperature  98.9 F   19 06:00


 


Pulse Rate  72   19 06:00


 


Respiratory Rate  20   19 06:00


 


Blood Pressure  126/64   19 06:00


 


O2 Sat by Pulse Oximetry (%)  96   19 12:00











Breast Exam: Yes: Other (deferred)


Uterus: Yes: Fundus Firm


Incision: Yes: Staples intact


Abdomen/GI: Yes: Abdomen soft


Lochia, amount: Moderate


Extremities: Yes: Calves non-tender


Activity: Other





- Labs


Labs: 


 CBC











WBC  9.5 K/mm3 (4.0-10.0)   19  07:00    


 


RBC  4.07 M/mm3 (3.60-5.2)   19  07:00    


 


Hgb  11.4 GM/dL (10.7-15.3)   19  07:00    


 


Hct  34.5 % (32.4-45.2)   19  07:00    


 


MCV  84.7 fl (80-96)   19  07:00    


 


MCH  28.1 pg (25.7-33.7)   19  07:00    


 


MCHC  33.1 g/dl (32.0-36.0)   19  07:00    


 


RDW  16.3 % (11.6-15.6)  H  19  07:00    


 


Plt Count  186 K/MM3 (134-434)   19  07:00    


 


MPV  8.7 fl (7.5-11.1)   19  07:00    


 


Absolute Neuts (auto)  7.1 K/mm3 (1.5-8.0)   19  07:00    


 


Neutrophils %  74.2 % (42.8-82.8)   19  07:00    


 


Lymphocytes %  14.6 % (8-40)  D 19  07:00    


 


Monocytes %  8.8 % (3.8-10.2)   19  07:00    


 


Eosinophils %  2.2 % (0-4.5)   19  07:00    


 


Basophils %  0.2 % (0-2.0)   19  07:00    


 


Nucleated RBC %  0 % (0-0)   19  07:00    











Other Findings, Remarks: 





abdominal binder and dressing removed





Problem List





- Problems


(1)  section


Code(s): Z98.89 - OTHER SPECIFIED POSTPROCEDURAL STATES * DO NOT USE *   





Assessment/Plan





POD # 1 in stable condition, incidental uterine window noted during surgery, 

moderate lochia on methergine series, benign abdomen and stable CBC.


-D/C irving


-encouraged ambulating


-continue PP/post-op care


-regular diet

## 2019-08-13 NOTE — PN
Progress Note (short form)





- Note


Progress Note: 





Anesthesia postop note





28 y/o F s/p spinal anesthesia/duramorph for  section


POD#1, vss, aaox3, pain well controlled, sensory motor intact distally


No anesthesia complications.

## 2019-08-14 RX ADMIN — Medication SCH TAB: at 09:15

## 2019-08-14 RX ADMIN — SIMETHICONE CHEW TAB 80 MG PRN MG: 80 TABLET ORAL at 13:12

## 2019-08-14 RX ADMIN — IBUPROFEN PRN MG: 600 TABLET, FILM COATED ORAL at 13:13

## 2019-08-14 RX ADMIN — ACETAMINOPHEN PRN MG: 325 TABLET ORAL at 13:12

## 2019-08-14 NOTE — OP
DATE OF OPERATION:  2019

 

PREOPERATIVE DIAGNOSIS:  A 39-week pregnancy, prior  section x2, desires

permanent tubal sterilization.  

 

POSTOPERATIVE DIAGNOSIS:  A 39-week pregnancy, prior  section x2, 
desires

permanent tubal sterilization.  

 

PROCEDURE:  Repeat low transverse  section.  

 

SURGEON:  Kimberley Dorantes M.D. 

 

ASSISTANT:  MAINE Guallpa 

 

ANESTHESIA:  Spinal

 

INTRAVENOUS FLUIDS:  Per anesthesia record

 

ESTIMATED BLOOD LOSS:  400 

 

URINE OUTPUT:  200 mL of clear urine at the end of the procedure

 

FINDINGS:  Viable male infant, KIM position, no nuchal, no meconium.  Uterine

dehiscence with intact fetal membranes, with the fetal head and shoulders 
protruding

externally to the uterus.  Tubes and ovaries were not visualized secondary to 
dense

abdominal adhesions anteriorly, laterally, and posteriorly.  

 

COMPLICATIONS:  None.  

 

CONDITION:  Stable to recovery room.  

 

DESCRIPTION OF PROCEDURE:  After appropriate consents were signed, patient was 
taken

to the operating room.  Spinal anesthesia was administered.  She was placed in 
the

supine position.  The abdomen was prepped and draped in the normal sterile 
fashion. 

Valladares catheter had been inserted prior to entry into the operating room.  
Timeout was

performed confirming correct patient and procedure.  A Pfannenstiel incision 
was then

made through the prior incision and carried through to the underlying layers 
with the

fascia was nicked in the midline, extended laterally with the scalpel secondary 
to

dense fascial tissue.  The inferior aspect of the fascia was grasped with the 
Kocher

clamps, tented upwards, and the rectus muscles dissected off bluntly and with 
[place]

scissors and with a scalpel.  Attention was then paid to the superior aspect 
which

was taken down with some difficulty secondary to dense adhesions to the 
muscles.  The

muscles were then bluntly  in the midline and with the Uastin scissors. 

Immediately upon  the muscles, fetal membranes were seen with no 
uterine

tissue appreciated.  Fetal head was seen through the membranes, which were 
intact. 

Through palpation the fetal head and shoulders were palpated and would appear 
to be

external to the uterine body.  The amniotic sac was gently incised, clear 
amniotic

fluid was noted.  The infant's head, shoulder, and body were delivered without

difficulty.  Cord was clamped and cut, and the infant was passed off to the 
waiting

pediatric staff.  The placenta was removed manually without difficulty.  Only a 
clear

edge inferiorly of the patient's lower uterine segment could be well 
visualized.  The

anterior aspect of the uterus appeared to be completely adhesed to the rectus 
muscles

laterally.  There were dense adhesions of the uterus as well to the rectus 
muscles,

not permitting any visualization of the adnexa without clear delineation between

rectus muscle and uterine muscle.  The inferior aspect of the uterine tissue 
was then

approximated to the superior aspect of the uterus, although clear delineation 
could

not be noted secondary to dense adhesions between the rectus muscles and the 
uterine

tissue.  This area was closed with a 0 Vicryl with hemostasis.  However, the 
edges of

what appeared to be the rectus muscles, several figure-of-eight 1-0 Vicryls had 
to be

used to help achieve hemostasis.  Interceed was placed over the area once 
hemostasis

was noted.  The fascia was then closed with 0 Vicryl.  The skin was closed with

staples.  Sponge, lap, needle count was correct x3.  Clear urine was noted at 
the end

of the procedure.  The patient was taken from the operating room to the 
recovery area

in stable condition.  The patient was counseled extensively about inability to 
perform

tubal sterilization secondary to dense adhesions, and she was advised to seek 
out

other contraception, as another pregnancy was strongly discouraged secondary to

abdominal adhesions and difficulty with this procedure.  Patient was also 
counseled

that prior to future GYN surgery, this will need to be done at the tertiary 
center

secondary to dense abdominal wall adhesions from the uterus.  Patient expressed

understanding of this counseling, was taken from the operating room to the 
recovery

area in stable condition. 

 

 

MD JAYLEEN RIVAS/[id number]

DD: 2019 16:57

DT: 2019 18:56

Job #:  [id number]

[id number]

## 2019-08-14 NOTE — PN
Post Partum Progress Note





- Subjective


Subjective: 


c/o pain scale 6/10 





Post Partum Day: 2


Type of Delivery: Repeat C/S


Vital Signs: 


 Vital Signs











Temperature  99.4 F   19 06:00


 


Pulse Rate  70   19 02:05


 


Respiratory Rate  18   19 02:05


 


Blood Pressure  132/76   19 02:05


 


O2 Sat by Pulse Oximetry (%)  96   19 12:00








 Selected Entries











  19





  17:45 21:45


 


Temperature 100.3 F H 100.4 F H











Breast Exam: Yes: Soft, Other (bottle 7 breast feeding ).  No: Engorged


Uterus: Yes: Fundus Firm, Fundus below umbilicus, Non-tender


Incision: Yes: Staples intact.  No: Redness, Oozing


Abdomen/GI: Yes: Abdomen soft (bs active), Passing flatus, Tolerating PO (diet)

.  No: Abdominal Distention, Tender


Lochia: Yes: Rubra


Lochia, amount: Moderate


Extremities: No: Calves non-tender


Perineum: Yes: Intact


Activity: Ambulating





- Labs


Labs: 


 CBC











WBC  9.5 K/mm3 (4.0-10.0)   19  07:00    


 


RBC  4.07 M/mm3 (3.60-5.2)   19  07:00    


 


Hgb  11.4 GM/dL (10.7-15.3)   19  07:00    


 


Hct  34.5 % (32.4-45.2)   19  07:00    


 


MCV  84.7 fl (80-96)   19  07:00    


 


MCH  28.1 pg (25.7-33.7)   19  07:00    


 


MCHC  33.1 g/dl (32.0-36.0)   19  07:00    


 


RDW  16.3 % (11.6-15.6)  H  19  07:00    


 


Plt Count  186 K/MM3 (134-434)   19  07:00    


 


MPV  8.7 fl (7.5-11.1)   19  07:00    


 


Absolute Neuts (auto)  7.1 K/mm3 (1.5-8.0)   19  07:00    


 


Neutrophils %  74.2 % (42.8-82.8)   19  07:00    


 


Lymphocytes %  14.6 % (8-40)  D 19  07:00    


 


Monocytes %  8.8 % (3.8-10.2)   19  07:00    


 


Eosinophils %  2.2 % (0-4.5)   19  07:00    


 


Basophils %  0.2 % (0-2.0)   19  07:00    


 


Nucleated RBC %  0 % (0-0)   19  07:00    














Problem List





- Problems


(1) Postpartum care following  delivery


Code(s): Z39.2 - ENCOUNTER FOR ROUTINE POSTPARTUM FOLLOW-UP   





Assessment/Plan


s/p RC/S BTL day #2  stable ,


encourage ambulation ,po fluids , deep breathing ex 


ct po care

## 2019-08-15 VITALS — SYSTOLIC BLOOD PRESSURE: 128 MMHG | DIASTOLIC BLOOD PRESSURE: 84 MMHG | HEART RATE: 70 BPM | TEMPERATURE: 99.3 F

## 2019-08-15 LAB
BASOPHILS # BLD: 0.4 % (ref 0–2)
DEPRECATED RDW RBC AUTO: 16.4 % (ref 11.6–15.6)
EOSINOPHIL # BLD: 3.4 % (ref 0–4.5)
HCT VFR BLD CALC: 32.3 % (ref 32.4–45.2)
HGB BLD-MCNC: 10.7 GM/DL (ref 10.7–15.3)
LYMPHOCYTES # BLD: 22.4 % (ref 8–40)
MCH RBC QN AUTO: 28.4 PG (ref 25.7–33.7)
MCHC RBC AUTO-ENTMCNC: 33.2 G/DL (ref 32–36)
MCV RBC: 85.6 FL (ref 80–96)
MONOCYTES # BLD AUTO: 8.5 % (ref 3.8–10.2)
NEUTROPHILS # BLD: 65.3 % (ref 42.8–82.8)
PLATELET # BLD AUTO: 211 K/MM3 (ref 134–434)
PMV BLD: 8.8 FL (ref 7.5–11.1)
RBC # BLD AUTO: 3.78 M/MM3 (ref 3.6–5.2)
WBC # BLD AUTO: 9 K/MM3 (ref 4–10)

## 2019-08-15 RX ADMIN — Medication SCH TAB: at 09:57

## 2019-08-15 RX ADMIN — IBUPROFEN PRN MG: 600 TABLET, FILM COATED ORAL at 02:36

## 2019-08-15 RX ADMIN — ACETAMINOPHEN PRN MG: 325 TABLET ORAL at 02:37

## 2019-08-15 NOTE — DS
Physical Examination


Vital Signs: 


 Vital Signs











Temperature  99.4 F   08/15/19 00:09


 


Pulse Rate  82   19 22:00


 


Respiratory Rate  18   19 22:00


 


Blood Pressure  136/57 L  19 22:00


 


O2 Sat by Pulse Oximetry (%)  96   19 12:00











Constitutional: Yes: Well Nourished, No Distress, Calm


Eyes: Yes: WNL, Conjunctiva Clear, EOM Intact


HENT: Yes: WNL, Atraumatic, Normocephalic


Neck: Yes: WNL, Supple, Trachea Midline


Cardiovascular: Yes: WNL, Regular Rate and Rhythm


Respiratory: Yes: WNL, Regular, CTA Bilaterally


Gastrointestinal: Yes: WNL, Normal Bowel Sounds


Musculoskeletal: Yes: WNL


Extremities: Yes: WNL


Edema: No


Integumentary: Yes: WNL


Neurological: Yes: WNL, Alert, Oriented


...Motor Strength: WNL


Psychiatric: Yes: WNL


Labs: 


 CBC, BMP





 19 07:00 











Discharge Summary


Reason For Visit: REPEAT 


Current Active Problems





Postpartum care following  delivery (Acute)








Procedures: Principal: RLS


Hospital Course: 





[facility]


She had uterine dehiscence discovered at the time of her C/S


Her postoperative course was unremarkable


She made all postoperative milestones


She was discharged home on POD#3





INDIA Dorantes MD


Condition: Stable





- Instructions


Diet, Activity, Other Instructions: 


Regular Diet


Follow up on  or  with Dr. Dorantes to have your staples removed


Follow up in 4-6 weeks for your postpartum visit


Referrals: 


Kimberley Dorantes MD [Staff Physician] - 


Disposition: HOME





- Home Medications


Comprehensive Discharge Medication List: 


Ambulatory Orders





Pnv No.95/Ferrous Fum/Folic AC [Prenatal Vitamin Tablet] 1 each PO DAILY  


Valacyclovir HCl [Valtrex] 500 mg PO DAILY 19 


Ibuprofen 600 mg PO Q6H PRN #30 tablet 19 


Oxycodone HCl/Acetaminophen [Percocet 5-325 mg Tablet -] 1 - 2 tab PO Q6H PRN #

20 tab MDD 15 19

## 2019-08-16 NOTE — PATH
Surgical Pathology Report



Patient Name:  YAS GUIDRY

Accession #:  [id number]

Med. Rec. #:  [id number]                                                        

   /Age/Gender:  1990 (Age: 29) / F

Account:  [id number]                                                          

             Location: [facility] OBS/GYN

Taken:  2019

Received:  2019

Reported:  2019

Physicians:  Kimberley Dorantes

  



Specimen(s) Received

 PLACENTA 





Clinical History

, 39.1 weeks repeat  with bilateral tubal ligation,  x3,

IAB x1, SPAB x2

HSV 2, history of preeclampsia 







Final Diagnosis

PLACENTA:

THIRD TRIMESTER PLACENTA WITH FOCAL CALCIFICATIONS.

TRIVASCULAR CORD.

MEMBRANES WITH NO DIAGNOSTIC ABNORMALITIES.





***Electronically Signed***

Kaia Webster M.D.





Gross Description

The specimen is received fresh labeled placenta and is a 539 gram, 15.5 x 14.5 x

3.0 cm. placenta with attached membranes and umbilical cord.  The attached

membranes are tan, translucent with focal opacities and insert marginally.  The

umbilical cord measures 45 cm. in length and averages 1.3 cm. in diameter.  The

cord inserts eccentrically, 4 cm. to the nearest margin.  No true knots or

strictures are identified. Cut surface of the umbilical cord reveals 3 vessels.

The fetal surface is grey blue with moderate fibrin deposition and appropriate

caliber vessels. The maternal surface is red-brown and intact. Sectioning

reveals red-brown, spongy parenchyma.  No lesions are identified. 

Representative sections are submitted in three cassettes as follows: 1- membrane

rolls and umbilical cord; 2-3- full thickness sections of placenta.

/8/15/2019



[facility]/8/15/2019

## 2022-04-26 ENCOUNTER — HOSPITAL ENCOUNTER (EMERGENCY)
Dept: HOSPITAL 74 - JERFT | Age: 32
Discharge: HOME | End: 2022-04-26
Payer: COMMERCIAL

## 2022-04-26 VITALS — DIASTOLIC BLOOD PRESSURE: 81 MMHG | SYSTOLIC BLOOD PRESSURE: 135 MMHG | HEART RATE: 90 BPM | TEMPERATURE: 98.6 F

## 2022-04-26 VITALS — BODY MASS INDEX: 41.1 KG/M2

## 2022-04-26 DIAGNOSIS — A08.4: Primary | ICD-10-CM

## 2022-04-26 LAB
ALBUMIN SERPL-MCNC: 3.7 G/DL (ref 3.4–5)
ALP SERPL-CCNC: 127 U/L (ref 45–117)
ALT SERPL-CCNC: 38 U/L (ref 13–61)
ANION GAP SERPL CALC-SCNC: 6 MMOL/L (ref 8–16)
APPEARANCE UR: (no result)
AST SERPL-CCNC: 12 U/L (ref 15–37)
BASOPHILS # BLD: 0.3 % (ref 0–2)
BILIRUB SERPL-MCNC: 0.2 MG/DL (ref 0.2–1)
BILIRUB UR STRIP.AUTO-MCNC: NEGATIVE MG/DL
BUN SERPL-MCNC: 13 MG/DL (ref 7–18)
CALCIUM SERPL-MCNC: 8.8 MG/DL (ref 8.5–10.1)
CHLORIDE SERPL-SCNC: 110 MMOL/L (ref 98–107)
CO2 SERPL-SCNC: 26 MMOL/L (ref 21–32)
COLOR UR: YELLOW
CREAT SERPL-MCNC: 0.7 MG/DL (ref 0.55–1.3)
DEPRECATED RDW RBC AUTO: 17 % (ref 11.6–15.6)
EOSINOPHIL # BLD: 1.6 % (ref 0–4.5)
GLUCOSE SERPL-MCNC: 111 MG/DL (ref 74–106)
HCT VFR BLD CALC: 34.7 % (ref 32.4–45.2)
HGB BLD-MCNC: 10.8 GM/DL (ref 10.7–15.3)
KETONES UR QL STRIP: (no result)
LEUKOCYTE ESTERASE UR QL STRIP.AUTO: NEGATIVE
LIPASE SERPL-CCNC: 68 U/L (ref 73–393)
LYMPHOCYTES # BLD: 30.4 % (ref 8–40)
MCH RBC QN AUTO: 22.8 PG (ref 25.7–33.7)
MCHC RBC AUTO-ENTMCNC: 31.2 G/DL (ref 32–36)
MCV RBC: 73.2 FL (ref 80–96)
MONOCYTES # BLD AUTO: 9.2 % (ref 3.8–10.2)
NEUTROPHILS # BLD: 58.5 % (ref 42.8–82.8)
NITRITE UR QL STRIP: NEGATIVE
PH UR: 6 [PH] (ref 5–8)
PLATELET # BLD AUTO: 419 10^3/UL (ref 134–434)
PMV BLD: 7.9 FL (ref 7.5–11.1)
PROT SERPL-MCNC: 7.9 G/DL (ref 6.4–8.2)
PROT UR QL STRIP: (no result)
PROT UR QL STRIP: NEGATIVE
RBC # BLD AUTO: 4.74 M/MM3 (ref 3.6–5.2)
SODIUM SERPL-SCNC: 142 MMOL/L (ref 136–145)
SP GR UR: 1.03 (ref 1.01–1.03)
UROBILINOGEN UR STRIP-MCNC: 1 MG/DL (ref 0.2–1)
WBC # BLD AUTO: 9.4 K/MM3 (ref 4–10)

## 2022-04-26 PROCEDURE — 3E033GC INTRODUCTION OF OTHER THERAPEUTIC SUBSTANCE INTO PERIPHERAL VEIN, PERCUTANEOUS APPROACH: ICD-10-PCS

## 2023-05-24 ENCOUNTER — HOSPITAL ENCOUNTER (EMERGENCY)
Dept: HOSPITAL 74 - JER | Age: 33
Discharge: HOME | End: 2023-05-24
Payer: COMMERCIAL

## 2023-05-24 VITALS — BODY MASS INDEX: 41.9 KG/M2

## 2023-05-24 VITALS
RESPIRATION RATE: 20 BRPM | TEMPERATURE: 98.5 F | SYSTOLIC BLOOD PRESSURE: 134 MMHG | HEART RATE: 75 BPM | DIASTOLIC BLOOD PRESSURE: 85 MMHG

## 2023-05-24 DIAGNOSIS — R09.89: Primary | ICD-10-CM

## 2023-05-24 DIAGNOSIS — B96.89: ICD-10-CM

## 2023-05-24 DIAGNOSIS — R09.3: ICD-10-CM

## 2023-05-24 DIAGNOSIS — J40: ICD-10-CM

## 2023-05-24 DIAGNOSIS — R05.9: ICD-10-CM

## 2023-05-24 DIAGNOSIS — J01.90: ICD-10-CM

## 2023-05-24 DIAGNOSIS — R09.81: ICD-10-CM

## 2023-05-24 LAB
ANION GAP SERPL CALC-SCNC: 3 MMOL/L (ref 8–16)
BASOPHILS # BLD: 0.5 % (ref 0–2)
BUN SERPL-MCNC: 8.8 MG/DL (ref 7–18)
CALCIUM SERPL-MCNC: 9.3 MG/DL (ref 8.5–10.1)
CHLORIDE SERPL-SCNC: 108 MMOL/L (ref 98–107)
CO2 SERPL-SCNC: 29 MMOL/L (ref 21–32)
CREAT SERPL-MCNC: 0.6 MG/DL (ref 0.55–1.3)
DEPRECATED RDW RBC AUTO: 16.9 % (ref 11.6–15.6)
EOSINOPHIL # BLD: 5.3 % (ref 0–4.5)
GLUCOSE SERPL-MCNC: 78 MG/DL (ref 74–106)
HCT VFR BLD CALC: 34.3 % (ref 32.4–45.2)
HGB BLD-MCNC: 10.7 GM/DL (ref 10.7–15.3)
LYMPHOCYTES # BLD: 28.7 % (ref 8–40)
MCH RBC QN AUTO: 23.5 PG (ref 25.7–33.7)
MCHC RBC AUTO-ENTMCNC: 31.2 G/DL (ref 32–36)
MCV RBC: 75.3 FL (ref 80–96)
MONOCYTES # BLD AUTO: 5.9 % (ref 3.8–10.2)
NEUTROPHILS # BLD: 59.6 % (ref 42.8–82.8)
PLATELET # BLD AUTO: 354 10^3/UL (ref 134–434)
PMV BLD: 8.8 FL (ref 7.5–11.1)
POTASSIUM SERPLBLD-SCNC: 4.2 MMOL/L (ref 3.5–5.1)
RBC # BLD AUTO: 4.56 M/MM3 (ref 3.6–5.2)
SODIUM SERPL-SCNC: 140 MMOL/L (ref 136–145)
WBC # BLD AUTO: 13.1 K/MM3 (ref 4–10)

## 2024-10-24 ENCOUNTER — HOSPITAL ENCOUNTER (EMERGENCY)
Dept: HOSPITAL 74 - JER | Age: 34
Discharge: HOME | End: 2024-10-24
Payer: COMMERCIAL

## 2024-10-24 VITALS
TEMPERATURE: 98.7 F | SYSTOLIC BLOOD PRESSURE: 138 MMHG | DIASTOLIC BLOOD PRESSURE: 80 MMHG | RESPIRATION RATE: 16 BRPM | HEART RATE: 73 BPM

## 2024-10-24 VITALS — BODY MASS INDEX: 34.7 KG/M2

## 2024-10-24 DIAGNOSIS — M54.50: ICD-10-CM

## 2024-10-24 DIAGNOSIS — R10.30: ICD-10-CM

## 2024-10-24 DIAGNOSIS — D25.9: ICD-10-CM

## 2024-10-24 DIAGNOSIS — N93.9: Primary | ICD-10-CM

## 2024-10-24 LAB
ANION GAP SERPL CALC-SCNC: 4 MMOL/L (ref 4–13)
APPEARANCE UR: (no result)
BACTERIA # UR AUTO: 558 /UL (ref 0–1359)
BASOPHILS # BLD: 0.6 % (ref 0–2)
BILIRUB UR STRIP.AUTO-MCNC: NEGATIVE MG/DL
BUN SERPL-MCNC: 13 MG/DL (ref 7–18)
CALCIUM SERPL-MCNC: 9.1 MG/DL (ref 8.5–10.1)
CASTS URNS QL MICRO: 1 /UL (ref 0–3.1)
CHLORIDE SERPL-SCNC: 112 MMOL/L (ref 98–107)
CO2 SERPL-SCNC: 27 MMOL/L (ref 21–32)
COLOR UR: YELLOW
CREAT SERPL-MCNC: 0.7 MG/DL (ref 0.55–1.3)
DEPRECATED RDW RBC AUTO: 17.4 % (ref 11.6–15.6)
EOSINOPHIL # BLD: 1.6 % (ref 0–4.5)
EPITH CASTS URNS QL MICRO: 26 /UL (ref 0–25.1)
GLUCOSE SERPL-MCNC: 101 MG/DL (ref 74–106)
HCT VFR BLD CALC: 30.2 % (ref 32.4–45.2)
HGB BLD-MCNC: 9.2 GM/DL (ref 10.7–15.3)
HIV 1+2 AB+HIV1 P24 AG SERPL QL IA: NEGATIVE
KETONES UR QL STRIP: (no result)
LEUKOCYTE ESTERASE UR QL STRIP.AUTO: NEGATIVE
LYMPHOCYTES # BLD: 29.2 % (ref 8–40)
MCH RBC QN AUTO: 22.4 PG (ref 25.7–33.7)
MCHC RBC AUTO-ENTMCNC: 30.6 G/DL (ref 32–36)
MCV RBC: 73.2 FL (ref 80–96)
MONOCYTES # BLD AUTO: 6.1 % (ref 3.8–10.2)
NEUTROPHILS # BLD: 62.5 % (ref 42.8–82.8)
NITRITE UR QL STRIP: NEGATIVE
PH UR: 6 [PH] (ref 5–8)
PLATELET # BLD AUTO: 354 10^3/UL (ref 134–434)
PMV BLD: 8 FL (ref 7.5–11.1)
POTASSIUM SERPLBLD-SCNC: 4.1 MMOL/L (ref 3.5–5.1)
PROT UR QL STRIP: NEGATIVE
PROT UR QL STRIP: NEGATIVE
RBC # BLD AUTO: 4.13 M/MM3 (ref 3.6–5.2)
RBC # BLD AUTO: 47 /UL (ref 0–23.9)
SODIUM SERPL-SCNC: 143 MMOL/L (ref 136–145)
SP GR UR: 1.03 (ref 1.01–1.03)
UROBILINOGEN UR STRIP-MCNC: 1 MG/DL (ref 0.2–1)
WBC # BLD AUTO: 10 K/MM3 (ref 4–10)
WBC # UR AUTO: 13 /UL (ref 0–25.8)

## 2024-10-24 PROCEDURE — 3E0333Z INTRODUCTION OF ANTI-INFLAMMATORY INTO PERIPHERAL VEIN, PERCUTANEOUS APPROACH: ICD-10-PCS | Performed by: STUDENT IN AN ORGANIZED HEALTH CARE EDUCATION/TRAINING PROGRAM

## 2024-10-24 RX ADMIN — KETOROLAC TROMETHAMINE ONE MG: 30 INJECTION INTRAMUSCULAR; INTRAVENOUS at 19:03
